# Patient Record
Sex: MALE | Race: BLACK OR AFRICAN AMERICAN | NOT HISPANIC OR LATINO | URBAN - METROPOLITAN AREA
[De-identification: names, ages, dates, MRNs, and addresses within clinical notes are randomized per-mention and may not be internally consistent; named-entity substitution may affect disease eponyms.]

---

## 2020-06-17 ENCOUNTER — OFFICE VISIT (OUTPATIENT)
Dept: URBAN - METROPOLITAN AREA CLINIC 113 | Facility: CLINIC | Age: 35
End: 2020-06-17

## 2020-07-25 ENCOUNTER — TELEPHONE ENCOUNTER (OUTPATIENT)
Dept: URBAN - METROPOLITAN AREA CLINIC 13 | Facility: CLINIC | Age: 35
End: 2020-07-25

## 2020-07-25 RX ORDER — DICYCLOMINE HYDROCHLORIDE 10 MG/1
TAKE 1 CAPSULE EVERY 6 HOURS PRN ABDOMINAL PAIN CAPSULE ORAL
Qty: 60 | Refills: 2 | OUTPATIENT
Start: 2017-08-31 | End: 2019-10-14

## 2020-07-25 RX ORDER — PROMETHAZINE HYDROCHLORIDE 12.5 MG/1
TAKE 1 TO 2 TABLETS EVERY 6 HOURS AS NEEDED TABLET ORAL
Qty: 60 | Refills: 1 | OUTPATIENT
Start: 2017-08-31 | End: 2019-10-14

## 2020-07-26 ENCOUNTER — TELEPHONE ENCOUNTER (OUTPATIENT)
Dept: URBAN - METROPOLITAN AREA CLINIC 13 | Facility: CLINIC | Age: 35
End: 2020-07-26

## 2020-07-26 RX ORDER — ELUXADOLINE 75 MG/1
TABLET, FILM COATED ORAL
Qty: 180 | Refills: 0 | Status: ACTIVE | COMMUNITY
Start: 2020-03-26

## 2020-07-26 RX ORDER — RIFAXIMIN 550 MG/1
TABLET ORAL
Qty: 42 | Refills: 0 | Status: ACTIVE | COMMUNITY
Start: 2020-05-07

## 2020-07-26 RX ORDER — PANTOPRAZOLE SODIUM 40 MG/1
TAKE 1 TABLET DAILY TABLET, DELAYED RELEASE ORAL
Qty: 60 | Refills: 11 | Status: ACTIVE | COMMUNITY

## 2020-07-26 RX ORDER — SUCRALFATE 1 G/1
TABLET ORAL
Qty: 120 | Refills: 0 | Status: ACTIVE | COMMUNITY
Start: 2019-10-23

## 2020-07-26 RX ORDER — TETRACYCLINE HYDROCHLORIDE 500 MG/1
CAPSULE ORAL
Qty: 56 | Refills: 0 | Status: ACTIVE | COMMUNITY
Start: 2020-06-09

## 2020-07-26 RX ORDER — METRONIDAZOLE 500 MG/1
TABLET ORAL
Qty: 42 | Refills: 0 | Status: ACTIVE | COMMUNITY
Start: 2020-06-09

## 2020-07-26 RX ORDER — DOXYCYCLINE HYCLATE 100 MG/1
TABLET ORAL
Qty: 20 | Refills: 0 | Status: ACTIVE | COMMUNITY
Start: 2020-03-26

## 2020-07-26 RX ORDER — BISMUTH SUBSALICYLATE 262 MG
TAKE 1 TABLET BY MOUTH FOUR TIMES A DAY FOR 14 DAYS TABLET ORAL
Qty: 72 | Refills: 0 | Status: ACTIVE | COMMUNITY
Start: 2020-06-09

## 2020-07-26 RX ORDER — CHOLESTYRAMINE 4 G/9G
POWDER, FOR SUSPENSION ORAL
Qty: 378 | Refills: 0 | Status: ACTIVE | COMMUNITY
Start: 2019-10-23

## 2020-07-26 RX ORDER — HYOSCYAMINE SULFATE 0.12 MG/1
TABLET, ORALLY DISINTEGRATING ORAL
Qty: 30 | Refills: 0 | Status: ACTIVE | COMMUNITY
Start: 2020-01-30

## 2020-09-16 ENCOUNTER — OFFICE VISIT (OUTPATIENT)
Dept: URBAN - METROPOLITAN AREA CLINIC 113 | Facility: CLINIC | Age: 35
End: 2020-09-16
Payer: COMMERCIAL

## 2020-09-16 VITALS
HEART RATE: 77 BPM | SYSTOLIC BLOOD PRESSURE: 134 MMHG | BODY MASS INDEX: 20.81 KG/M2 | DIASTOLIC BLOOD PRESSURE: 75 MMHG | WEIGHT: 157 LBS | HEIGHT: 73 IN | TEMPERATURE: 98.4 F

## 2020-09-16 DIAGNOSIS — E80.6 INDIRECT HYPERBILIRUBINEMIA: ICD-10-CM

## 2020-09-16 DIAGNOSIS — R10.13 EPIGASTRIC PAIN: ICD-10-CM

## 2020-09-16 PROCEDURE — G8420 CALC BMI NORM PARAMETERS: HCPCS | Performed by: INTERNAL MEDICINE

## 2020-09-16 PROCEDURE — G8427 DOCREV CUR MEDS BY ELIG CLIN: HCPCS | Performed by: INTERNAL MEDICINE

## 2020-09-16 PROCEDURE — 99204 OFFICE O/P NEW MOD 45 MIN: CPT | Performed by: INTERNAL MEDICINE

## 2020-09-16 PROCEDURE — 1036F TOBACCO NON-USER: CPT | Performed by: INTERNAL MEDICINE

## 2020-09-16 RX ORDER — BUSPIRONE HYDROCHLORIDE 5 MG/1
1 TABLET TABLET ORAL TWICE A DAY
Qty: 60 | Refills: 3 | OUTPATIENT
Start: 2020-09-19

## 2020-09-16 RX ORDER — PANTOPRAZOLE SODIUM 40 MG/1
TAKE 1 TABLET DAILY TABLET, DELAYED RELEASE ORAL
Qty: 60 | Refills: 11 | Status: ACTIVE | COMMUNITY

## 2020-09-16 RX ORDER — PANTOPRAZOLE SODIUM 40 MG/1
TAKE 1 TABLET DAILY TABLET, DELAYED RELEASE ORAL
OUTPATIENT

## 2020-09-16 NOTE — HPI-TODAY'S VISIT:
Mr. Singh is a 35-year-old gentleman with a history of cholecystectomy presenting for evaluation of upper abdominal pain.  He was initially seen in 2017 for epigastric pain and weight loss.  He had undergone  an unremarkable evaluation in [place], [place] including EGD and cholecystectomy.  Nonulcerative dyspepsia was suspected.  He was recommended an H. pylori breath test, labs, and begin Dexilant 60 mg daily.  H. pylori breath test was negative x2.    He did not return for follow up.    He was most recently referred to Dr. Donald.   An EGD on 10/29/19 was notable for normal esophagus, gastritis, nonbleeding duodenual ulcer (4mm).  Gastric biopsy showed chronic gastritis negative for H. pylori, and unremarkable duodenal mucosa. CT a/p without contrast (4/9/20) was unremarkable except for constipation. Labs (8/13/20): BMP normal, TB 3.3, ALP 81, AST 24, ALT 16, lipase 39, amylase 95.  Labs (3/26/20): TB 3.4, DB <0.2, ALP 80, AST 21, ALT 10, TP 7.7, Alb 4.4 Labs (4/24/20): WBC 3.8, Hgb 14.6, Plt 244;  Labs (3/26/20): TB 3.4, DB < 0.2, AST 21, ALT 10, ALP 80, TP 7.7, ALB 4.4. TAE < 1:80 Labs (10/30/19): AMA 11.2, TAE < 1:80, ASMA 11. Labs: TB 5.0, DB 0.2

## 2020-09-16 NOTE — PHYSICAL EXAM HENT:
Head,  normocephalic,  atraumatic,  Face,  Face within normal limits,  Ears,  External ears within normal limits,  Nose/Nasopharynx,  External nose  normal appearance, wearing a mask

## 2020-09-16 NOTE — PHYSICAL EXAM CHEST:
breathing is unlabored without accessory muscle use, normal breath sounds, clear to auscultation bilaterally

## 2020-09-16 NOTE — HPI-TODAY'S VISIT:
He presents as a second opinion regarding his chronic epigastric pain, nausea and dry heaves.  The abdominal pain is nonradiating, constant, waxes and wanes in severity but has no obvious triggering factor.  He has associated early satiety.  He reports that he is lost 10 to 15 pounds over the past years.  He has increased amounts of belching.  He denies any dysphagia.  He continues to take pantoprazole 40 mg daily, but does not believe that it is significantly improved his symptoms.  He typically has a bowel movement every other day.  He has had episodes of diarrhea in the past.  No blood per rectum or melena.  He is scheduled to have a gastric emptying study in late October.

## 2020-09-16 NOTE — PHYSICAL EXAM GASTROINTESTINAL
Abdomen , soft, nondistended , normal bowel sounds, mild epigastric tenderness without r/g; Liver and Spleen , no hepatosplenomegaly

## 2020-09-18 ENCOUNTER — TELEPHONE ENCOUNTER (OUTPATIENT)
Dept: URBAN - METROPOLITAN AREA CLINIC 113 | Facility: CLINIC | Age: 35
End: 2020-09-18

## 2020-10-06 ENCOUNTER — TELEPHONE ENCOUNTER (OUTPATIENT)
Dept: URBAN - METROPOLITAN AREA CLINIC 113 | Facility: CLINIC | Age: 35
End: 2020-10-06

## 2020-11-04 ENCOUNTER — TELEPHONE ENCOUNTER (OUTPATIENT)
Dept: URBAN - METROPOLITAN AREA CLINIC 113 | Facility: CLINIC | Age: 35
End: 2020-11-04

## 2020-11-19 ENCOUNTER — OFFICE VISIT (OUTPATIENT)
Dept: URBAN - METROPOLITAN AREA CLINIC 113 | Facility: CLINIC | Age: 35
End: 2020-11-19

## 2020-11-19 RX ORDER — BUSPIRONE HYDROCHLORIDE 5 MG/1
1 TABLET TABLET ORAL TWICE A DAY
Qty: 60 | Refills: 3 | Status: ACTIVE | COMMUNITY
Start: 2020-09-19

## 2020-11-19 RX ORDER — PANTOPRAZOLE SODIUM 40 MG/1
TAKE 1 TABLET DAILY TABLET, DELAYED RELEASE ORAL
Status: ACTIVE | COMMUNITY

## 2020-11-19 NOTE — HPI-TODAY'S VISIT:
Mr. Singh is a 35-year-old male presenting for follow up regarding stomach issues.   He was last seen for a second opinion 9/16/2020 regarding  chronic epigastric pain, nausea and dry heaves.  He reported pain was nonradiating, constant, waxing and waning in severity without obvious triggering factor.  He reported associated early satiety and a 10-15lb weight loss over years. Pantoprazole had provided no benefit. He reported a BM QOD.  He admitted episodes of diarrhea in the past but no blood per rectum or melena.  He was scheduled to have a gastric emptying study in late October.

## 2020-11-19 NOTE — HPI-OTHER HISTORIES
He was initially seen in 2017 for epigastric pain and weight loss.  He had undergone  an unremarkable evaluation in [place], [place] including EGD and cholecystectomy.  Nonulcerative dyspepsia was suspected.  He was recommended an H. pylori breath test, labs, and begin Dexilant 60 mg daily.  H. pylori breath test was negative x2.    He did not return for follow up.   . An EGD on 10/29/19 was notable for normal esophagus, gastritis, nonbleeding duodenual ulcer (4mm).  Gastric biopsy showed chronic gastritis negative for H. pylori, and unremarkable duodenal mucosa. CT a/p without contrast (4/9/20) was unremarkable except for constipation. Labs (8/13/20) : BMP normal, TB 3.3, ALP 81, AST 24, ALT 16, lipase 39, amylase 95. Labs (3/26/20) : TB 3.4, DB <0.2, ALP 80, AST 21, ALT 10, TP 7.7, Alb 4.4 Labs (4/24/20) : WBC 3.8, Hgb 14.6, Plt 244; Labs (3/26/20) : TB 3.4, DB < 0.2, AST 21, ALT 10, ALP 80, TP 7.7, ALB 4.4. TAE < 1 : 80 Labs (10/30/19) : AMA 11.2, TAE < 1:80, ASMA 11. Labs : TB 5.0, DB 0.2

## 2020-11-19 NOTE — HPI-TODAY'S VISIT:
He presents as a second opinion regarding his chronic epigastric pain, nausea and dry heaves.  The abdominal pain is nonradiating, constant, waxes and wanes in severity but has no obvious triggering factor.  He has associated early satiety.  He reports that he is lost 10 to 15 pounds over the past years.  He has increased amounts of belching.  He denies any dysphagia.  He continues to take pantoprazole 40 mg daily, but does not believe that it is significantly improved his symptoms.  He typically has a bowel movement every other day.  He has had episodes of diarrhea in the past.  No blood per rectum or melena.  He is scheduled to have a gastric emptying study in late October. Records were reviewed.  The differential diagnosis included nonulcer dyspepsia.  He reported no significant improvement with pantoprazole.  Visceral hypersensitivity and the potential benefit of a nonnarcotic pain modulator such as buspirone or a low dose tricyclic antidepressant were discussed.  He agreed to begin buspirone.  Gastric emptying study was ordered to assess for gastroparesis due to a complaint of early satiety.  Labs were notable for indirect hyperbilirubinemia likely associated with Gilbert syndrome.  It was discussed there was no laboratory evidence of hemolysis and recent CT abdomen pelvis showed no obvious hepatobiliary abnormalities.  An MRI/MRCP was determined to likely be of low yield.  He was prescribed BuSpar 5 mg twice a day and instructed to continue pantoprazole 40 mg daily.

## 2020-12-10 ENCOUNTER — OFFICE VISIT (OUTPATIENT)
Dept: URBAN - METROPOLITAN AREA CLINIC 113 | Facility: CLINIC | Age: 35
End: 2020-12-10
Payer: COMMERCIAL

## 2020-12-10 VITALS
SYSTOLIC BLOOD PRESSURE: 122 MMHG | TEMPERATURE: 97.5 F | BODY MASS INDEX: 21.74 KG/M2 | WEIGHT: 164 LBS | DIASTOLIC BLOOD PRESSURE: 83 MMHG | HEART RATE: 74 BPM | HEIGHT: 73 IN

## 2020-12-10 DIAGNOSIS — E80.6 INDIRECT HYPERBILIRUBINEMIA: ICD-10-CM

## 2020-12-10 DIAGNOSIS — R10.13 EPIGASTRIC PAIN: ICD-10-CM

## 2020-12-10 PROCEDURE — 99204 OFFICE O/P NEW MOD 45 MIN: CPT | Performed by: INTERNAL MEDICINE

## 2020-12-10 RX ORDER — BUSPIRONE HYDROCHLORIDE 5 MG/1
1 TABLET TABLET ORAL TWICE A DAY
Qty: 60 | Refills: 3 | Status: ON HOLD | COMMUNITY
Start: 2020-09-19

## 2020-12-10 RX ORDER — SUCRALFATE 1 G
1 TABLET ON AN EMPTY STOMACH TABLET ORAL TWICE A DAY
Qty: 60 | OUTPATIENT
Start: 2020-12-10 | End: 2021-01-09

## 2020-12-10 RX ORDER — PANTOPRAZOLE SODIUM 40 MG/1
TAKE 1 TABLET DAILY TABLET, DELAYED RELEASE ORAL
Status: ON HOLD | COMMUNITY

## 2020-12-10 NOTE — HPI-TODAY'S VISIT:
Mr Pisano is a 35-year-old gentleman with a history of cholecystectomy that who was originally self-referred here in 2017 for evaluation of "stomach issues." He was seen by Dr. Romo at that point.    He reported a one-yearhistory of epigastric pain that was constant, waxed and waned in severity, nonradiating, and occasionally associated with dizziness.  He had significant amounts of belching independent of meals.  The pain does not seem to be exacerbated by meals.  His weight had declined from 180 pounds to 160 pounds secondary to these symptoms.  He had occasional bouts of diarrhea but otherwise his bowel habits were regular and stool consistency was normal.  He denied any fevers, chills, melena, or red blood per rectum.  He did not routinely use NSAIDs and rarely had heartburn, and he denied any regurgitation or dysphagia.  He has taken Prilosec over-the-counter in the past without any improvement.  He was evaluated in [facility] and apparently underwent an EGD and cholecystectomy.  His symptoms have persisted despite the cholecystectomy.  CT of the abdomen and pelvis on 9/6/17 was normal except for mild gastritis and prior cholecystectomy.  Labs were normal except for a mild elevation of total bilirubin with normal LFTs. CBC, CMP, and CRP were normal.  H pylori breath tests were normal.   He has not been back here for follow up since his initial New Patient evaluation. The patient tells me that he began having abdominal pain in the epigastric area while living in [facility] about 5 years ago.  He describes the pain is constant and unrelenting, located primarily at the epigastrium.  He associates the pain with belching or bloating.  Food ingestion does not change the pain character or intensity.  It is 5 out of 10 in intensity typically.  Interestingly, it is better after the ingestion of ice cream or milk products, but returns after a day.  Spicy foods will increase his symptoms as were eating later in the evening.  It sometimes awakens him from sleep.  He has had nausea but no emesis.  He has had no melena, hematemesis, coffee-ground emesis, bright red rectal bleeding or melena.  His bowels are regular, with 1 bowel movement per day typically which is formed.  Although he lost 15 pounds initially after this pain began, his weight has been stable now for 3 years.  He had an episode of syncope as a result of the pain about a month ago, which prompted him to seek additional evaluation. Since seeing Dr. Romo in 2017, he was referred to Dr. Rodriguez Donald at [facility].  Dr. Donald performed a CT scan of the abdomen and pelvis on October 19, 2019 which was remarkable only for prior cholecystectomy and an ileus.  An upper endoscopy performed on October 29, 2019 showed gastritis and a 4 mm duodenal ulcer with biopsies being negative for Helicobacter pylori.  Small bowel biopsies to rule out celiac sprue were negative.  Patient had a CT scan of the abdomen and pelvis on April 9, 2020 which only showed constipation.  A gastric emptying study done on October 29, 2020 showed a T1 half emptying of 52%, normal being 50%, which places this within the realm of normalcy. This is the extent of the patient's evaluation thus far.

## 2021-01-11 ENCOUNTER — ERX REFILL RESPONSE (OUTPATIENT)
Dept: URBAN - METROPOLITAN AREA CLINIC 113 | Facility: CLINIC | Age: 36
End: 2021-01-11

## 2021-01-11 RX ORDER — SUCRALFATE 1 G/1
1 TABLET ON AN EMPTY STOMACH TABLET ORAL TWICE A DAY
Qty: 60 | Refills: 0

## 2021-02-09 ENCOUNTER — OFFICE VISIT (OUTPATIENT)
Dept: URBAN - METROPOLITAN AREA CLINIC 113 | Facility: CLINIC | Age: 36
End: 2021-02-09

## 2021-02-09 NOTE — HPI-TODAY'S VISIT:
Mr Pisano is a 35-year-old gentleman with a history of cholecystectomy that who was originally self-referred here in 2017 for evaluation of "stomach issues." He was seen by Dr. Romo at that point.    He reported a one-yearhistory of epigastric pain that was constant, waxed and waned in severity, nonradiating, and occasionally associated with dizziness.  He had significant amounts of belching independent of meals.  The pain does not seem to be exacerbated by meals.  His weight had declined from 180 pounds to 160 pounds secondary to these symptoms.  He had occasional bouts of diarrhea but otherwise his bowel habits were regular and stool consistency was normal.  He denied any fevers, chills, melena, or red blood per rectum.  He did not routinely use NSAIDs and rarely had heartburn, and he denied any regurgitation or dysphagia.  He has taken Prilosec over-the-counter in the past without any improvement.  He was evaluated in [facility] and apparently underwent an EGD and cholecystectomy.  His symptoms have persisted despite the cholecystectomy.  CT of the abdomen and pelvis on 9/6/17 was normal except for mild gastritis and prior cholecystectomy.  Labs were normal except for a mild elevation of total bilirubin with normal LFTs. CBC, CMP, and CRP were normal.  H pylori breath tests were normal.   He has not been back here for follow up since his initial New Patient evaluation. The patient tells me that he began having abdominal pain in the epigastric area while living in [facility] about 5 years ago.  He describes the pain is constant and unrelenting, located primarily at the epigastrium.  He associates the pain with belching or bloating.  Food ingestion does not change the pain character or intensity.  It is 5 out of 10 in intensity typically.  Interestingly, it is better after the ingestion of ice cream or milk products, but returns after a day.  Spicy foods will increase his symptoms as were eating later in the evening.  It sometimes awakens him from sleep.  He has had nausea but no emesis.  He has had no melena, hematemesis, coffee-ground emesis, bright red rectal bleeding or melena.  His bowels are regular, with 1 bowel movement per day typically which is formed.  Although he lost 15 pounds initially after this pain began, his weight has been stable now for 3 years.  He had an episode of syncope as a result of the pain about a month ago, which prompted him to seek additional evaluation. Since seeing Dr. Romo in 2017, he was referred to Dr. Rodriguez Donald at [facility].  Dr. Donald performed a CT scan of the abdomen and pelvis on October 19, 2019 which was remarkable only for prior cholecystectomy and an ileus.  An upper endoscopy performed on October 29, 2019 showed gastritis and a 4 mm duodenal ulcer with biopsies being negative for Helicobacter pylori.  Small bowel biopsies to rule out celiac sprue were negative.  Patient had a CT scan of the abdomen and pelvis on April 9, 2020 which only showed constipation.  A gastric emptying study done on October 29, 2020 showed a T1 half emptying of 52%, normal being 50%, which places this within the realm of normalcy. This is the extent of the patient's evaluation thus far.

## 2021-02-11 ENCOUNTER — TELEPHONE ENCOUNTER (OUTPATIENT)
Dept: URBAN - METROPOLITAN AREA CLINIC 113 | Facility: CLINIC | Age: 36
End: 2021-02-11

## 2021-02-16 ENCOUNTER — OFFICE VISIT (OUTPATIENT)
Dept: URBAN - METROPOLITAN AREA CLINIC 113 | Facility: CLINIC | Age: 36
End: 2021-02-16
Payer: COMMERCIAL

## 2021-02-16 VITALS
DIASTOLIC BLOOD PRESSURE: 74 MMHG | HEART RATE: 90 BPM | WEIGHT: 164 LBS | TEMPERATURE: 98.6 F | HEIGHT: 73 IN | RESPIRATION RATE: 20 BRPM | SYSTOLIC BLOOD PRESSURE: 120 MMHG | BODY MASS INDEX: 21.74 KG/M2

## 2021-02-16 DIAGNOSIS — R10.13 EPIGASTRIC PAIN: ICD-10-CM

## 2021-02-16 DIAGNOSIS — E80.6 INDIRECT HYPERBILIRUBINEMIA: ICD-10-CM

## 2021-02-16 PROCEDURE — 99214 OFFICE O/P EST MOD 30 MIN: CPT | Performed by: INTERNAL MEDICINE

## 2021-02-16 RX ORDER — PANTOPRAZOLE SODIUM 40 MG/1
TAKE 1 TABLET DAILY TABLET, DELAYED RELEASE ORAL
Status: ON HOLD | COMMUNITY

## 2021-02-16 RX ORDER — BUSPIRONE HYDROCHLORIDE 5 MG/1
1 TABLET TABLET ORAL TWICE A DAY
Qty: 60 | Refills: 3 | Status: ON HOLD | COMMUNITY
Start: 2020-09-19

## 2021-02-16 RX ORDER — AMITRIPTYLINE HYDROCHLORIDE 25 MG/1
1 TABLET AT BEDTIME TABLET, FILM COATED ORAL ONCE A DAY
Qty: 30 | OUTPATIENT
Start: 2021-02-16

## 2021-02-16 NOTE — HPI-TODAY'S VISIT:
Mr Pisano is a 35-year-old gentleman with a history of cholecystectomy that who was originally self-referred here in 2017 for evaluation of "stomach issues." He was seen by Dr. Romo at that point.    He reported a one-yearhistory of epigastric pain that was constant, waxed and waned in severity, nonradiating, and occasionally associated with dizziness.  He had significant amounts of belching independent of meals.  The pain does not seem to be exacerbated by meals.  His weight had declined from 180 pounds to 160 pounds secondary to these symptoms.  He had occasional bouts of diarrhea but otherwise his bowel habits were regular and stool consistency was normal.  He denied any fevers, chills, melena, or red blood per rectum.  He did not routinely use NSAIDs and rarely had heartburn, and he denied any regurgitation or dysphagia.  He has taken Prilosec over-the-counter in the past without any improvement.  He was evaluated in [facility] and apparently underwent an EGD and cholecystectomy.  His symptoms have persisted despite the cholecystectomy.  CT of the abdomen and pelvis on 9/6/17 was normal except for mild gastritis and prior cholecystectomy.  Labs were normal except for a mild elevation of total bilirubin with normal LFTs. CBC, CMP, and CRP were normal.  H pylori breath tests were normal.   He has not been back here for follow up since his initial New Patient evaluation. The patient tells me that he began having abdominal pain in the epigastric area while living in [facility] about 5 years ago.  He describes the pain is constant and unrelenting, located primarily at the epigastrium.  He associates the pain with belching or bloating.  Food ingestion does not change the pain character or intensity.  It is 5 out of 10 in intensity typically.  Interestingly, it is better after the ingestion of ice cream or milk products, but returns after a day.  Spicy foods will increase his symptoms as were eating later in the evening.  It sometimes awakens him from sleep.  He has had nausea but no emesis.  He has had no melena, hematemesis, coffee-ground emesis, bright red rectal bleeding or melena.  His bowels are regular, with 1 bowel movement per day typically which is formed.  Although he lost 15 pounds initially after this pain began, his weight has been stable now for 3 years.  He had an episode of syncope as a result of the pain about a month ago, which prompted him to seek additional evaluation. Since seeing Dr. Romo in 2017, he was referred to Dr. Rodriguez Donald at [facility].  Dr. Donald performed a CT scan of the abdomen and pelvis on October 19, 2019 which was remarkable only for prior cholecystectomy and an ileus.  An upper endoscopy performed on October 29, 2019 showed gastritis and a 4 mm duodenal ulcer with biopsies being negative for Helicobacter pylori.  Small bowel biopsies to rule out celiac sprue were negative.  Patient had a CT scan of the abdomen and pelvis on April 9, 2020 which only showed constipation.  A gastric emptying study done on October 29, 2020 showed a T1 half emptying of 52%, normal being 50%, which places this within the realm of normalcy. This is the extent of the patient's evaluation At the time of his last visit here on December 10, 2020.  After that visit, the patient was placed on pantoprazole and Carafate, as well as buspirone.  He was scheduled for an MRCP which was done on December 30, 2020.  This was negative.  He had a prior gastric emptying study which was negative, so he did not schedule this.  The patient returns today with persistent sensation of being full after eating and abdominal bloating.  His weight is stable at 164 pounds.  He denies nausea and vomiting.   He felt that the Carafate was not helpful so he stopped it.

## 2021-03-08 ENCOUNTER — OFFICE VISIT (OUTPATIENT)
Dept: URBAN - METROPOLITAN AREA CLINIC 113 | Facility: CLINIC | Age: 36
End: 2021-03-08

## 2021-03-15 ENCOUNTER — ERX REFILL RESPONSE (OUTPATIENT)
Dept: URBAN - METROPOLITAN AREA CLINIC 113 | Facility: CLINIC | Age: 36
End: 2021-03-15

## 2021-03-15 RX ORDER — AMITRIPTYLINE HYDROCHLORIDE 25 MG/1
1 TABLET AT BEDTIME TABLET, FILM COATED ORAL ONCE A DAY
Qty: 30 | Refills: 0

## 2021-03-28 ENCOUNTER — ERX REFILL RESPONSE (OUTPATIENT)
Dept: URBAN - METROPOLITAN AREA CLINIC 113 | Facility: CLINIC | Age: 36
End: 2021-03-28

## 2021-03-28 RX ORDER — AMITRIPTYLINE HYDROCHLORIDE 25 MG/1
1 TABLET AT BEDTIME TABLET, FILM COATED ORAL ONCE A DAY
Qty: 30 | Refills: 0 | OUTPATIENT

## 2021-03-28 RX ORDER — AMITRIPTYLINE HYDROCHLORIDE 25 MG/1
TAKE 1 TABLET BY MOUTH EVERYDAY AT BEDTIME TABLET, FILM COATED ORAL
Qty: 90 TABLET | Refills: 0 | OUTPATIENT

## 2021-04-15 ENCOUNTER — OFFICE VISIT (OUTPATIENT)
Dept: URBAN - METROPOLITAN AREA CLINIC 107 | Facility: CLINIC | Age: 36
End: 2021-04-15

## 2021-04-15 RX ORDER — PANTOPRAZOLE SODIUM 40 MG/1
TAKE 1 TABLET DAILY TABLET, DELAYED RELEASE ORAL
Status: ON HOLD | COMMUNITY

## 2021-04-15 RX ORDER — AMITRIPTYLINE HYDROCHLORIDE 25 MG/1
TAKE 1 TABLET BY MOUTH EVERYDAY AT BEDTIME TABLET, FILM COATED ORAL
Qty: 90 TABLET | Refills: 0 | Status: ACTIVE | COMMUNITY

## 2021-04-15 RX ORDER — BUSPIRONE HYDROCHLORIDE 5 MG/1
1 TABLET TABLET ORAL TWICE A DAY
Qty: 60 | Refills: 3 | Status: ON HOLD | COMMUNITY
Start: 2020-09-19

## 2021-04-15 NOTE — HPI-TODAY'S VISIT:
This is a 35-year-old gentleman with a history of GERD, epigastric pain, and unintentional weight loss presenting for evaluation of abdominal pain.  He was seen once in August 2017 for GERD, epigastric pain, and weight loss that has been present for a year.  He had undergone cholecystectomy without improvement of symptoms.  Labs and an H. pylori breath test were ordered and he was prescribed Dexilant.  CMP, CBC, and CRP were negative and H. pylori breath test was negative.  He did not return for follow-up. Labs:4/9/2020 CBC: WBC 2.29, hemoglobin 14.3, MCV 91.9, platelet 269.  TTG IgG 6 (H), TTG IgA less than 2.  3/26/2020 include RF 10.8.  TAE less than 1-80.  5/8/2020 stool O&P, C. difficile, culture negative. CT of the abdomen and pelvis without contrast 4/9/2020:Constipation otherwise negative study.

## 2021-04-30 ENCOUNTER — OFFICE VISIT (OUTPATIENT)
Dept: URBAN - METROPOLITAN AREA CLINIC 107 | Facility: CLINIC | Age: 36
End: 2021-04-30
Payer: COMMERCIAL

## 2021-04-30 VITALS
HEART RATE: 72 BPM | WEIGHT: 164 LBS | DIASTOLIC BLOOD PRESSURE: 85 MMHG | HEIGHT: 73 IN | TEMPERATURE: 97.5 F | RESPIRATION RATE: 18 BRPM | SYSTOLIC BLOOD PRESSURE: 127 MMHG | BODY MASS INDEX: 21.74 KG/M2

## 2021-04-30 DIAGNOSIS — R10.13 EPIGASTRIC PAIN: ICD-10-CM

## 2021-04-30 PROCEDURE — 99213 OFFICE O/P EST LOW 20 MIN: CPT | Performed by: NURSE PRACTITIONER

## 2021-04-30 RX ORDER — BUSPIRONE HYDROCHLORIDE 5 MG/1
1 TABLET TABLET ORAL TWICE A DAY
Qty: 60 | Refills: 3 | Status: ON HOLD | COMMUNITY
Start: 2020-09-19

## 2021-04-30 RX ORDER — PANTOPRAZOLE SODIUM 40 MG/1
TAKE 1 TABLET DAILY TABLET, DELAYED RELEASE ORAL
Status: ON HOLD | COMMUNITY

## 2021-04-30 RX ORDER — AMITRIPTYLINE HYDROCHLORIDE 25 MG/1
TAKE 1 TABLET BY MOUTH EVERYDAY AT BEDTIME TABLET, FILM COATED ORAL
Qty: 90 TABLET | Refills: 0 | Status: ACTIVE | COMMUNITY

## 2021-05-01 PROBLEM — 3696007: Status: ACTIVE | Noted: 2021-05-01

## 2021-05-01 PROBLEM — 14783006: Status: ACTIVE | Noted: 2020-09-16

## 2021-06-28 ENCOUNTER — TELEPHONE ENCOUNTER (OUTPATIENT)
Dept: URBAN - METROPOLITAN AREA CLINIC 113 | Facility: CLINIC | Age: 36
End: 2021-06-28

## 2021-06-29 ENCOUNTER — OFFICE VISIT (OUTPATIENT)
Dept: URBAN - METROPOLITAN AREA CLINIC 107 | Facility: CLINIC | Age: 36
End: 2021-06-29

## 2021-06-29 RX ORDER — BUSPIRONE HYDROCHLORIDE 5 MG/1
1 TABLET TABLET ORAL TWICE A DAY
Qty: 60 | Refills: 3 | Status: ON HOLD | COMMUNITY
Start: 2020-09-19

## 2021-06-29 RX ORDER — AMITRIPTYLINE HYDROCHLORIDE 25 MG/1
TAKE 1 TABLET BY MOUTH EVERYDAY AT BEDTIME TABLET, FILM COATED ORAL
Qty: 90 TABLET | Refills: 0 | Status: ACTIVE | COMMUNITY

## 2021-06-29 RX ORDER — PANTOPRAZOLE SODIUM 40 MG/1
TAKE 1 TABLET DAILY TABLET, DELAYED RELEASE ORAL
Status: ON HOLD | COMMUNITY

## 2021-06-29 NOTE — HPI-TODAY'S VISIT:
This is a 35-year-old male with a history of chronic epigastric abdominal pain and indirect hyperbilirubinemia likely Gilbert's syndrome presenting for follow-up after a trial of amitriptyline.  He was initially seen 2/16/2021.  He reported a 1 year history of constant epigastric pain that waxed and waned in severity.  He had previously undergone a diagnostic evaluation including labs, CT, and EGD.  He had also had an MRCP to evaluate hyperbilirubinemia.  No organic findings were discovered on evaluation.  There is been no significant improvement with pantoprazole.  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  He was prescribed amitriptyline as a nonnarcotic pain modulator.  Amitriptyline has been of no benefit.  He continues to have pain indicated in epigastrium when his stomach is empty.  This pain is not exacerbated by eating.  When he eats, he feels as though his food is "not digesting."  He has frequent belching and feels bloated.  He has intermittent nausea associated with his symptoms.  He denies heartburn, dysphagia, red blood per rectum, diarrhea, or constipation.  He is wondering if he needs another EGD.

## 2021-10-06 ENCOUNTER — OFFICE VISIT (OUTPATIENT)
Dept: URBAN - METROPOLITAN AREA CLINIC 107 | Facility: CLINIC | Age: 36
End: 2021-10-06

## 2021-11-23 ENCOUNTER — OFFICE VISIT (OUTPATIENT)
Dept: URBAN - METROPOLITAN AREA CLINIC 107 | Facility: CLINIC | Age: 36
End: 2021-11-23

## 2022-01-07 ENCOUNTER — WEB ENCOUNTER (OUTPATIENT)
Dept: URBAN - METROPOLITAN AREA CLINIC 113 | Facility: CLINIC | Age: 37
End: 2022-01-07

## 2022-01-07 ENCOUNTER — OFFICE VISIT (OUTPATIENT)
Dept: URBAN - METROPOLITAN AREA CLINIC 113 | Facility: CLINIC | Age: 37
End: 2022-01-07
Payer: COMMERCIAL

## 2022-01-07 VITALS
HEIGHT: 73 IN | TEMPERATURE: 98.1 F | SYSTOLIC BLOOD PRESSURE: 145 MMHG | BODY MASS INDEX: 21.47 KG/M2 | RESPIRATION RATE: 18 BRPM | DIASTOLIC BLOOD PRESSURE: 78 MMHG | HEART RATE: 81 BPM | WEIGHT: 162 LBS

## 2022-01-07 DIAGNOSIS — R63.4 WEIGHT LOSS: ICD-10-CM

## 2022-01-07 DIAGNOSIS — K58.0 IRRITABLE BOWEL SYNDROME WITH DIARRHEA: ICD-10-CM

## 2022-01-07 DIAGNOSIS — R10.13 EPIGASTRIC PAIN: ICD-10-CM

## 2022-01-07 DIAGNOSIS — E80.6 INDIRECT HYPERBILIRUBINEMIA: ICD-10-CM

## 2022-01-07 DIAGNOSIS — R14.0 ABDOMINAL BLOATING: ICD-10-CM

## 2022-01-07 PROCEDURE — 99214 OFFICE O/P EST MOD 30 MIN: CPT | Performed by: NURSE PRACTITIONER

## 2022-01-07 RX ORDER — BUSPIRONE HYDROCHLORIDE 5 MG/1
1 TABLET TABLET ORAL TWICE A DAY
Qty: 60 | Refills: 3 | Status: DISCONTINUED | COMMUNITY
Start: 2020-09-19

## 2022-01-07 RX ORDER — AMITRIPTYLINE HYDROCHLORIDE 25 MG/1
TAKE 1 TABLET BY MOUTH EVERYDAY AT BEDTIME TABLET, FILM COATED ORAL
Qty: 90 TABLET | Refills: 0 | Status: DISCONTINUED | COMMUNITY

## 2022-01-07 RX ORDER — PANTOPRAZOLE SODIUM 40 MG/1
TAKE 1 TABLET DAILY TABLET, DELAYED RELEASE ORAL
Status: DISCONTINUED | COMMUNITY

## 2022-01-07 NOTE — HPI-TODAY'S VISIT:
This is a 36-year-old male with a history of chronic epigastric abdominal pain and indirect hyperbilirubinemia likely Gilbert's syndrome presenting for follow-up.   He was last seen 4/30/2021.  He had initially been seen in February 2021 and reported a 1 year history of constant pain.  Diagnostic evaluation with labs, CT, EGD, and MRCP were unremarkable.  There had been no improvement with pantoprazole.  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  He continued to have pain indicating epigastrium when his stomach was empty.  It was not exacerbated by eating and he felt as though his food was not digesting.  He had frequent belching and bloating.  He had intermittent nausea associated with symptoms.  He denied other abdominal symptoms.  It was discussed that his symptoms are chronic and likely functional associated with nonulcer dyspepsia and that diet may be playing a role.  Lifestyle modification was discussed to reduce air swallowing and a diet low in fermentable sugars was recommended.  He was to try FDgard prior to meals.  Cymbalta was a future consideration.  Due to a history of a small duodenal ulcer, repeat EGD was also a consideration.  He continues to report persistent, ongoing symptoms.  He has been on a low FODMAP diet since his last visit and reports no improvement.  He reports frequent belching and bloating.  He continues to slowly lose weight (our records reflect a 2 pound weight loss since his last visit).  He has persistent pain indicated in the epigastrium that is worse after meals and occasionally worse during a bowel movement.  He had worsening pain on Nexium and discontinued it.  He denies NSAID use.  He reports intermittent dark urine even with drinking adequate amounts of water.  He has occasional skin rashes and itching. He continues to report a sensation that his food is not digesting and it "sits in my stomach too long."  He reports intermittent diarrhea.  He is having 2 bowel movements per day most days.  His stools are occasionally watery.  He denies nocturnal bowel movements, red blood per rectum, or melena.

## 2022-01-07 NOTE — HPI-OTHER HISTORIES
MRCP 12/30/2021:Status postcholecystectomy.  No evidence of biliary dilation or choledocholithiasis.  There is a segment of the common hepatic duct which is not visualized due to artifact from cholecystectomy clips.  Paucity of body fat.  Incidentally noted Riedel's lobe and pectus excavatum. Gastric emptying study 10/29/2020:T1 half emptying of 52%, normal being 50% placing this in the realm of normal. CT abdomen and pelvis without contrast 4/9/2020: Constipation otherwise negative study.  Labs: 12/22/2021 H. pylori negative.  Urinalysis normal with exception of specific gravity greater than 1.030, trace protein, trace blood.  CBC: WBC 2.9, hemoglobin 14.5, MCV 93.4, platelet 239.  BMP normal.  LFTs: TB 2.9, ALP 94, ALT 15, AST 12.  Lipase 25.  Amylase 77. 5/8/2020:Stool culture negative.  Stool C. difficile negative.  Stool O&P negative. 8/13/2020:Hepatitis A antibody IgM, hepatitis B surface antigen, hepatitis B core antibody, hepatitis C antibody nonreactive.  HIV nonreactive.  H. pylori negative.  BMP normal with exception of carbon dioxide 32, glucose 60.  LFTs: TB 3.3, ALP 81, ALT 16, AST 24.  Amylase 95.  Lipase 39. EGD 10/29/2019:Normal esophagus, gastritis, nonbleeding duodenal ulcer (4 mm).  Gastric biopsy showed chronic gastritis negative for H. pylori and unremarkable duodenal mucosa.  (Dr. Donald). CT of the abdomen and pelvis with contrast 9/6/2017:Negative study for focal mass, lymphadenopathy, or inflammatory process.  Mild gastritis and cholecystectomy.  Mild gastrointestinal stasis pattern.

## 2022-01-08 ENCOUNTER — TELEPHONE ENCOUNTER (OUTPATIENT)
Dept: URBAN - METROPOLITAN AREA CLINIC 113 | Facility: CLINIC | Age: 37
End: 2022-01-08

## 2022-04-14 ENCOUNTER — OFFICE VISIT (OUTPATIENT)
Dept: URBAN - METROPOLITAN AREA CLINIC 113 | Facility: CLINIC | Age: 37
End: 2022-04-14
Payer: COMMERCIAL

## 2022-04-14 VITALS
RESPIRATION RATE: 18 BRPM | HEART RATE: 73 BPM | WEIGHT: 160 LBS | TEMPERATURE: 98.2 F | DIASTOLIC BLOOD PRESSURE: 81 MMHG | SYSTOLIC BLOOD PRESSURE: 120 MMHG | HEIGHT: 73 IN | BODY MASS INDEX: 21.2 KG/M2

## 2022-04-14 DIAGNOSIS — R63.4 WEIGHT LOSS: ICD-10-CM

## 2022-04-14 DIAGNOSIS — R14.0 ABDOMINAL BLOATING: ICD-10-CM

## 2022-04-14 DIAGNOSIS — E80.6 INDIRECT HYPERBILIRUBINEMIA: ICD-10-CM

## 2022-04-14 DIAGNOSIS — R10.13 EPIGASTRIC PAIN: ICD-10-CM

## 2022-04-14 DIAGNOSIS — K58.0 IRRITABLE BOWEL SYNDROME WITH DIARRHEA: ICD-10-CM

## 2022-04-14 PROBLEM — 197125005: Status: ACTIVE | Noted: 2022-01-07

## 2022-04-14 PROCEDURE — 99214 OFFICE O/P EST MOD 30 MIN: CPT | Performed by: INTERNAL MEDICINE

## 2022-04-14 NOTE — PHYSICAL EXAM GASTROINTESTINAL
Abdomen , soft, epigastrium tender, nondistended , no guarding or rigidity , no masses palpable , normal bowel sounds , Liver and Spleen , no hepatosplenomegaly

## 2022-04-14 NOTE — HPI-TODAY'S VISIT:
This is a 36-year-old male with a history of chronic epigastric abdominal pain and indirect hyperbilirubinemia likely Gilbert's syndrome presenting for follow-up.  He was last seen 1/7/22 by Abby Mcnair.  He had initially been seen in February 2021 and reported a 1 year history of constant pain.  Diagnostic evaluation with labs, CT, EGD, and MRCP were unremarkable.  There had been no improvement with pantoprazole.  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  He continued to have pain indicating epigastrium when his stomach was empty.  It was not exacerbated by eating and he felt as though his food was not digesting.  He had frequent belching and bloating.  He had intermittent nausea associated with symptoms.  He denied other abdominal symptoms.  It was discussed that his symptoms are chronic and likely functional associated with nonulcer dyspepsia and that diet may be playing a role.  Lifestyle modification was discussed to reduce air swallowing and a diet low in fermentable sugars was recommended.  He was to try FDgard prior to meals.  Cymbalta was a future consideration.  Due to a history of a small duodenal ulcer, repeat EGD was also a consideration.  At his last visit here on 1/7/22, he continued to report persistent, ongoing symptoms.  He has been on a low FODMAP diet since his last visit and reports no improvement.  He reports frequent belching and bloating.  He continues to slowly lose weight (our records reflect a 2 pound weight loss since his last visit, and 4 lbs overall).  He has persistent epigastric pain, worse after meals, and occasionally worse during a bowel movement.  He had worsening pain on Nexium and discontinued it.  He denies NSAID use.  He reports intermittent dark urine even with drinking adequate amounts of water.  He has occasional skin rashes and itching. He continues to report a sensation that his food is not digesting and says it "sits in my stomach too long."  He reports intermittent diarrhea.  He is having 2-3 loose bowel movements per day most days.  His stools are occasionally watery.  He denies nocturnal bowel movements, red blood per rectum, or melena.   After his last visit with Abby Mcnair, the patient had an order for Xifaxan sent in, and was supposed to be ordered for a Doppler sonogram of the liver and mesenteric vessels.  Neither of these took place, as the Xifaxan was too expensive, and the Doppler sonogram was never done.  His symptoms persist, as described above.  They are unchanged.  He has complaints of periodic epigastric discomfort, Almost 20 daily basis, with increased bloating and belching.  As noted above, he has lost an additional 2 pounds since his last office visit.

## 2022-04-20 ENCOUNTER — TELEPHONE ENCOUNTER (OUTPATIENT)
Dept: URBAN - METROPOLITAN AREA CLINIC 113 | Facility: CLINIC | Age: 37
End: 2022-04-20

## 2022-05-02 ENCOUNTER — TELEPHONE ENCOUNTER (OUTPATIENT)
Dept: URBAN - METROPOLITAN AREA CLINIC 113 | Facility: CLINIC | Age: 37
End: 2022-05-02

## 2022-05-02 RX ORDER — METRONIDAZOLE 250 MG/1
1 TABLET TABLET ORAL THREE TIMES A DAY
Qty: 30 | OUTPATIENT
Start: 2022-05-02 | End: 2022-05-12

## 2022-07-18 ENCOUNTER — OFFICE VISIT (OUTPATIENT)
Dept: URBAN - METROPOLITAN AREA CLINIC 113 | Facility: CLINIC | Age: 37
End: 2022-07-18

## 2022-08-11 ENCOUNTER — TELEPHONE ENCOUNTER (OUTPATIENT)
Dept: URBAN - METROPOLITAN AREA CLINIC 113 | Facility: CLINIC | Age: 37
End: 2022-08-11

## 2022-10-03 ENCOUNTER — OFFICE VISIT (OUTPATIENT)
Dept: URBAN - METROPOLITAN AREA CLINIC 113 | Facility: CLINIC | Age: 37
End: 2022-10-03
Payer: COMMERCIAL

## 2022-10-03 VITALS
DIASTOLIC BLOOD PRESSURE: 76 MMHG | BODY MASS INDEX: 20.81 KG/M2 | WEIGHT: 157 LBS | TEMPERATURE: 98 F | RESPIRATION RATE: 20 BRPM | HEIGHT: 73 IN | SYSTOLIC BLOOD PRESSURE: 124 MMHG | HEART RATE: 83 BPM

## 2022-10-03 DIAGNOSIS — R63.4 WEIGHT LOSS: ICD-10-CM

## 2022-10-03 DIAGNOSIS — R10.13 EPIGASTRIC PAIN: ICD-10-CM

## 2022-10-03 DIAGNOSIS — E80.6 INDIRECT HYPERBILIRUBINEMIA: ICD-10-CM

## 2022-10-03 DIAGNOSIS — R14.0 ABDOMINAL BLOATING: ICD-10-CM

## 2022-10-03 PROCEDURE — 99214 OFFICE O/P EST MOD 30 MIN: CPT | Performed by: INTERNAL MEDICINE

## 2022-10-03 NOTE — HPI-TODAY'S VISIT:
This is a 37-year-old male with a history of chronic epigastric abdominal pain and indirect hyperbilirubinemia likely Gilbert's syndrome presenting for follow-up.  He was last seen on 4/14/22.  He had initially been seen in February 2021 and reported a 1 year history of constant pain.  Diagnostic evaluation with labs, CT, EGD, and MRCP were unremarkable.  There had been no improvement with pantoprazole.  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  He continued to have pain indicating epigastrium when his stomach was empty.  It was not exacerbated by eating and he felt as though his food was not digesting.  He had frequent belching and bloating.  He had intermittent nausea associated with symptoms.  He denied other abdominal symptoms.  It was discussed that his symptoms are chronic and likely functional associated with nonulcer dyspepsia and that diet may be playing a role.  Lifestyle modification was discussed to reduce air swallowing and a diet low in fermentable sugars was recommended.  He was to try FDgard prior to meals.  Cymbalta was a future consideration.  Due to a history of a small duodenal ulcer, repeat EGD was also a consideration.  At his visit here on 1/7/22, he continued to report persistent, ongoing symptoms.  He had been on a low FODMAP diet since his prior visit and reported no improvement.  He c/o frequent belching and bloating.  He continued to slowly lose weight (our records reflected a 2 pound weight loss since his last visit, and 4 lbs overall).  He had persistent epigastric pain, worse after meals, and occasionally worse during a bowel movement.  He had worse pain on Nexium and discontinued it.  He denies NSAID use.  He reported intermittent dark urine even with drinking adequate amounts of water.  He described occasional skin rashes and itching. He continued to report a sensation that his food was not digesting and said it "sits in (his) stomach too long."  He reported intermittent diarrhea.  He was having 2-3 loose bowel movements per day most days.  His stools were occasionally watery.  He denied nocturnal bowel movements, red blood per rectum, or melena.   After his 1/7/22 visit with Abby Mcnair, the patient had an order for Xifaxan sent in, and was supposed to be ordered for a Doppler sonogram of the liver and mesenteric vessels.  Neither of these took place, as the Xifaxan was too expensive, and the Doppler sonogram was never done.  At the time of his 4/14/2022 visit, his symptoms persisted, as described above, and were unchanged.  He had complaints of periodic epigastric pain on an almost daily basis, with increased bloating and belching.  The decision was made to place him on Xifaxan, and he had a CT angiogram scheduled which was done on August 4, 2022.  This showed a narrowed celiac axis with prestenotic dilatation consistent with median arcuate ligament syndrome.  He was referred to Dr. Idris Swartz, who corroborated the diagnosis and refer the patient to Dr. Herve Oliva.  Dr. Oliva felt that he could do the surgery, but the patient's insurance would not allow him to do the surgery.  Dr. Oliva subsequently made the referral to Dr. John Carrillo, who the patient is to see tomorrow for the first time.

## 2023-01-03 ENCOUNTER — LAB OUTSIDE AN ENCOUNTER (OUTPATIENT)
Dept: URBAN - METROPOLITAN AREA CLINIC 113 | Facility: CLINIC | Age: 38
End: 2023-01-03

## 2023-01-03 ENCOUNTER — OFFICE VISIT (OUTPATIENT)
Dept: URBAN - METROPOLITAN AREA CLINIC 113 | Facility: CLINIC | Age: 38
End: 2023-01-03
Payer: COMMERCIAL

## 2023-01-03 VITALS
RESPIRATION RATE: 16 BRPM | HEART RATE: 83 BPM | SYSTOLIC BLOOD PRESSURE: 128 MMHG | TEMPERATURE: 98.2 F | BODY MASS INDEX: 21.6 KG/M2 | WEIGHT: 163 LBS | HEIGHT: 73 IN | DIASTOLIC BLOOD PRESSURE: 83 MMHG

## 2023-01-03 DIAGNOSIS — E80.6 INDIRECT HYPERBILIRUBINEMIA: ICD-10-CM

## 2023-01-03 DIAGNOSIS — R63.4 WEIGHT LOSS: ICD-10-CM

## 2023-01-03 DIAGNOSIS — R14.0 ABDOMINAL BLOATING: ICD-10-CM

## 2023-01-03 DIAGNOSIS — R10.13 EPIGASTRIC PAIN: ICD-10-CM

## 2023-01-03 PROBLEM — 7752002: Status: ACTIVE | Noted: 2020-09-19

## 2023-01-03 PROBLEM — 89362005: Status: ACTIVE | Noted: 2022-01-08

## 2023-01-03 PROBLEM — 116289008: Status: ACTIVE | Noted: 2022-01-08

## 2023-01-03 PROCEDURE — 99214 OFFICE O/P EST MOD 30 MIN: CPT | Performed by: INTERNAL MEDICINE

## 2023-01-03 RX ORDER — HYDROXYZINE PAMOATE 25 MG/1
1 CAPSULE AT BEDTIME AS NEEDED CAPSULE ORAL
Qty: 60 | Refills: 2 | OUTPATIENT
Start: 2023-01-03

## 2023-01-03 NOTE — HPI-TODAY'S VISIT:
This is a 37-year-old male with a history of chronic epigastric abdominal pain and indirect hyperbilirubinemia likely Gilbert's syndrome presenting for follow-up.  He was last seen on 4/14/22.  He had initially been seen in February 2021 and reported a 1 year history of constant pain.  Diagnostic evaluation with labs, CT, EGD, and MRCP were unremarkable.  There had been no improvement with pantoprazole.  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  He continued to have pain indicating epigastrium when his stomach was empty.  It was not exacerbated by eating and he felt as though his food was not digesting.  He had frequent belching and bloating.  He had intermittent nausea associated with symptoms.  He denied other abdominal symptoms.  It was discussed that his symptoms are chronic and likely functional associated with nonulcer dyspepsia and that diet may be playing a role.  Lifestyle modification was discussed to reduce air swallowing and a diet low in fermentable sugars was recommended.  He was to try FDgard prior to meals.  Cymbalta was a future consideration.  Due to a history of a small duodenal ulcer, repeat EGD was also a consideration.  At his visit here on 1/7/22, he continued to report persistent, ongoing symptoms.  He had been on a low FODMAP diet since his prior visit and reported no improvement.  He c/o frequent belching and bloating.  He continued to slowly lose weight (our records reflected a 2 pound weight loss since his last visit, and 4 lbs overall).  He had persistent epigastric pain, worse after meals, and occasionally worse during a bowel movement.  He had worse pain on Nexium and discontinued it.  He denies NSAID use.  He reported intermittent dark urine even with drinking adequate amounts of water.  He described occasional skin rashes and itching. He continued to report a sensation that his food was not digesting and said it "sits in (his) stomach too long."  He reported intermittent diarrhea.  He was having 2-3 loose bowel movements per day most days.  His stools were occasionally watery.  He denied nocturnal bowel movements, red blood per rectum, or melena.   After his 1/7/22 visit with Abby Mcnair, the patient had an order for Xifaxan sent in, and was supposed to be ordered for a Doppler sonogram of the liver and mesenteric vessels.  Neither of these took place, as the Xifaxan was too expensive, and the Doppler sonogram was never done.  At the time of his 4/14/2022 visit, his symptoms persisted, as described above, and were unchanged.  He had complaints of periodic epigastric pain on an almost daily basis, with increased bloating and belching.  The decision was made to place him on Xifaxan, and he had a CT angiogram scheduled which was done on August 4, 2022.  This showed a narrowed celiac axis with prestenotic dilatation consistent with median arcuate ligament syndrome.  He was referred to Dr. Idris Swartz, who corroborated the diagnosis and refer the patient to Dr. Herve Oliva.  Dr. Oliva felt that he could do the surgery, but the patient's insurance would not allow him to do the surgery.  Dr. Oliva subsequently made the referral to Dr. John Carrillo, who  performed the surgery two months ago.  Unfortunately, this has not resulted in any improvement in his symptoms.  He still has complaints of nausea, epigastric pain which he describes as "tightness", early satiety, and weight loss.  He has bloating after eating, and also is concerned because he continues to have problems with intermittent jaundice.  He typically develops jaundice after exercise, and can actually have pruritus if the jaundice becomes severe enough.

## 2023-01-04 ENCOUNTER — TELEPHONE ENCOUNTER (OUTPATIENT)
Dept: URBAN - METROPOLITAN AREA CLINIC 113 | Facility: CLINIC | Age: 38
End: 2023-01-04

## 2023-01-19 ENCOUNTER — ERX REFILL RESPONSE (OUTPATIENT)
Dept: URBAN - METROPOLITAN AREA CLINIC 113 | Facility: CLINIC | Age: 38
End: 2023-01-19

## 2023-01-19 RX ORDER — RIFAXIMIN 550 MG/1
(PA IN PROCESS) TAKE 1 TABLET BY MOUTH THREE TIMES A DAY FOR 14 DAYS TABLET ORAL
Qty: 42 TABLET | Refills: 2 | OUTPATIENT

## 2023-01-23 ENCOUNTER — TELEPHONE ENCOUNTER (OUTPATIENT)
Dept: URBAN - METROPOLITAN AREA CLINIC 113 | Facility: CLINIC | Age: 38
End: 2023-01-23

## 2023-01-27 ENCOUNTER — LAB OUTSIDE AN ENCOUNTER (OUTPATIENT)
Dept: URBAN - METROPOLITAN AREA CLINIC 113 | Facility: CLINIC | Age: 38
End: 2023-01-27

## 2023-01-27 ENCOUNTER — TELEPHONE ENCOUNTER (OUTPATIENT)
Dept: URBAN - METROPOLITAN AREA CLINIC 113 | Facility: CLINIC | Age: 38
End: 2023-01-27

## 2023-01-29 ENCOUNTER — ERX REFILL RESPONSE (OUTPATIENT)
Dept: URBAN - METROPOLITAN AREA CLINIC 113 | Facility: CLINIC | Age: 38
End: 2023-01-29

## 2023-01-29 RX ORDER — RIFAXIMIN 550 MG/1
(PA IN PROCESS) TAKE 1 TABLET BY MOUTH THREE TIMES A DAY FOR 14 DAYS TABLET ORAL
Qty: 42 TABLET | Refills: 2 | OUTPATIENT

## 2023-01-29 RX ORDER — RIFAXIMIN 550 MG/1
(PA IN PROCESS) TAKE 1 TABLET BY MOUTH THREE TIMES A DAY FOR 14 DAYS TABLET ORAL
Qty: 42 TABLET | Refills: 3 | OUTPATIENT

## 2023-02-24 ENCOUNTER — OFFICE VISIT (OUTPATIENT)
Dept: URBAN - METROPOLITAN AREA MEDICAL CENTER 43 | Facility: MEDICAL CENTER | Age: 38
End: 2023-02-24
Payer: COMMERCIAL

## 2023-02-24 DIAGNOSIS — R10.84 ABDOMINAL CRAMPING, GENERALIZED: ICD-10-CM

## 2023-02-24 DIAGNOSIS — R10.13 EPIGASTRIC PAIN: ICD-10-CM

## 2023-02-24 DIAGNOSIS — R11.2 ACUTE NAUSEA WITH NONBILIOUS VOMITING: ICD-10-CM

## 2023-02-24 DIAGNOSIS — R19.4 CHANGE IN BOWEL HABIT: ICD-10-CM

## 2023-02-24 PROCEDURE — 45378 DIAGNOSTIC COLONOSCOPY: CPT | Performed by: INTERNAL MEDICINE

## 2023-02-24 PROCEDURE — 43235 EGD DIAGNOSTIC BRUSH WASH: CPT | Performed by: INTERNAL MEDICINE

## 2023-02-28 ENCOUNTER — TELEPHONE ENCOUNTER (OUTPATIENT)
Dept: URBAN - METROPOLITAN AREA CLINIC 113 | Facility: CLINIC | Age: 38
End: 2023-02-28

## 2023-02-28 RX ORDER — RIFAXIMIN 550 MG/1
(PA IN PROCESS) TAKE 1 TABLET BY MOUTH THREE TIMES A DAY FOR 14 DAYS TABLET ORAL
Qty: 42 TABLET | Refills: 2 | Status: ACTIVE | COMMUNITY

## 2023-02-28 RX ORDER — POLYETHYLENE GLYCOL 3350, SODIUM CHLORIDE, SODIUM BICARBONATE, POTASSIUM CHLORIDE 420; 11.2; 5.72; 1.48 G/4L; G/4L; G/4L; G/4L
AS DIRECTED POWDER, FOR SOLUTION ORAL ONCE
Qty: 420 GM | Refills: 0 | OUTPATIENT
Start: 2023-02-28 | End: 2023-03-01

## 2023-02-28 RX ORDER — HYDROXYZINE PAMOATE 25 MG/1
1 CAPSULE AT BEDTIME AS NEEDED CAPSULE ORAL
Qty: 60 | Refills: 2 | Status: ACTIVE | COMMUNITY
Start: 2023-01-03

## 2023-03-02 ENCOUNTER — OFFICE VISIT (OUTPATIENT)
Dept: URBAN - METROPOLITAN AREA MEDICAL CENTER 2 | Facility: MEDICAL CENTER | Age: 38
End: 2023-03-02
Payer: COMMERCIAL

## 2023-03-02 DIAGNOSIS — R10.84 ABDOMINAL CRAMPING, GENERALIZED: ICD-10-CM

## 2023-03-02 DIAGNOSIS — K63.5 BENIGN COLON POLYP: ICD-10-CM

## 2023-03-02 DIAGNOSIS — K63.89 APPENDICITIS EPIPLOICA: ICD-10-CM

## 2023-03-02 DIAGNOSIS — R19.7 ACUTE DIARRHEA: ICD-10-CM

## 2023-03-02 PROCEDURE — 45380 COLONOSCOPY AND BIOPSY: CPT | Performed by: INTERNAL MEDICINE

## 2023-03-02 PROCEDURE — 45385 COLONOSCOPY W/LESION REMOVAL: CPT | Performed by: INTERNAL MEDICINE

## 2023-03-06 PROBLEM — 79922009: Status: ACTIVE | Noted: 2020-09-16

## 2023-03-14 ENCOUNTER — TELEPHONE ENCOUNTER (OUTPATIENT)
Dept: URBAN - METROPOLITAN AREA CLINIC 113 | Facility: CLINIC | Age: 38
End: 2023-03-14

## 2023-04-10 ENCOUNTER — DASHBOARD ENCOUNTERS (OUTPATIENT)
Age: 38
End: 2023-04-10

## 2023-04-10 ENCOUNTER — WEB ENCOUNTER (OUTPATIENT)
Dept: URBAN - METROPOLITAN AREA CLINIC 113 | Facility: CLINIC | Age: 38
End: 2023-04-10

## 2023-04-10 ENCOUNTER — OFFICE VISIT (OUTPATIENT)
Dept: URBAN - METROPOLITAN AREA CLINIC 113 | Facility: CLINIC | Age: 38
End: 2023-04-10
Payer: COMMERCIAL

## 2023-04-10 VITALS
HEART RATE: 65 BPM | BODY MASS INDEX: 21.2 KG/M2 | RESPIRATION RATE: 18 BRPM | HEIGHT: 73 IN | WEIGHT: 160 LBS | DIASTOLIC BLOOD PRESSURE: 75 MMHG | TEMPERATURE: 97.5 F | SYSTOLIC BLOOD PRESSURE: 125 MMHG

## 2023-04-10 DIAGNOSIS — E80.6 INDIRECT HYPERBILIRUBINEMIA: ICD-10-CM

## 2023-04-10 DIAGNOSIS — R10.13 EPIGASTRIC PAIN: ICD-10-CM

## 2023-04-10 DIAGNOSIS — R14.0 ABDOMINAL BLOATING: ICD-10-CM

## 2023-04-10 PROCEDURE — 99214 OFFICE O/P EST MOD 30 MIN: CPT | Performed by: INTERNAL MEDICINE

## 2023-04-10 RX ORDER — HYDROXYZINE PAMOATE 25 MG/1
1 CAPSULE AT BEDTIME AS NEEDED CAPSULE ORAL
Qty: 60 | Refills: 2 | Status: ON HOLD | COMMUNITY
Start: 2023-01-03

## 2023-04-10 RX ORDER — RIFAXIMIN 550 MG/1
(PA IN PROCESS) TAKE 1 TABLET BY MOUTH THREE TIMES A DAY FOR 14 DAYS TABLET ORAL
Qty: 42 TABLET | Refills: 2 | Status: ON HOLD | COMMUNITY

## 2023-04-10 NOTE — HPI-TODAY'S VISIT:
This is a 37-year-old male with a history of chronic epigastric abdominal pain and indirect hyperbilirubinemia likely Gilbert's syndrome presenting for follow-up.  He was last seen on 1/3/23.  He had initially been seen in February 2021 and reported a 1 year history of constant pain.  Diagnostic evaluation with labs, CT, EGD, and MRCP were unremarkable.  There had been no improvement with pantoprazole.  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  He continued to have pain indicating epigastrium when his stomach was empty.  It was not exacerbated by eating and he felt as though his food was not digesting.  He had frequent belching and bloating.  He had intermittent nausea associated with symptoms.  He denied other abdominal symptoms.  It was discussed that his symptoms are chronic and likely functional associated with nonulcer dyspepsia and that diet may be playing a role.  Lifestyle modification was discussed to reduce air swallowing and a diet low in fermentable sugars was recommended.  He was to try FDgard prior to meals.  Cymbalta was a future consideration.  Due to a history of a small duodenal ulcer, repeat EGD was also a consideration.  At his visit here on 1/7/22, he continued to report persistent, ongoing symptoms.  He had been on a low FODMAP diet since his prior visit and reported no improvement.  He c/o frequent belching and bloating.  He continued to slowly lose weight (our records reflected a 2 pound weight loss since his last visit, and 4 lbs overall).  He had persistent epigastric pain, worse after meals, and occasionally worse during a bowel movement.  He had worse pain on Nexium and discontinued it.  He denies NSAID use.  He reported intermittent dark urine even with drinking adequate amounts of water.  He described occasional skin rashes and itching. He continued to report a sensation that his food was not digesting and said it "sits in (his) stomach too long."  He reported intermittent diarrhea.  He was having 2-3 loose bowel movements per day most days.  His stools were occasionally watery.  He denied nocturnal bowel movements, red blood per rectum, or melena.   After his 1/7/22 visit with Abby Mcnair, the patient had an order for Xifaxan sent in, and was supposed to be ordered for a Doppler sonogram of the liver and mesenteric vessels.  Neither of these took place, as the Xifaxan was too expensive, and the Doppler sonogram was never done.  At the time of his 4/14/2022 visit, his symptoms persisted, as described above, and were unchanged.  He had complaints of periodic epigastric pain on an almost daily basis, with increased bloating and belching.  The decision was made to place him on Xifaxan, and he had a CT angiogram scheduled which was done on August 4, 2022.  This showed a narrowed celiac axis with prestenotic dilatation consistent with median arcuate ligament syndrome.  He was referred to Dr. Idris Swartz, who corroborated the diagnosis and referred the patient to Dr. Herve Oliva.  Dr. Oliva felt that he could do the surgery, but the patient's insurance would not allow him to do the surgery.  Dr. Oliva subsequently made the referral to Dr. John Carrillo, who  performed the surgery in November 2022.  Unfortunately, this did not result in any improvement in his symptoms.  He still had complaints of nausea, epigastric pain (which he described as "tightness"), early satiety, and weight loss.  He had bloating after eating, and also was concerned because he continued to have problems with intermittent jaundice.  He typically develops jaundice after exercise, and can actually have pruritus if the jaundice becomes severe enough.After his last visit, we checked a complete metabolic panel and 1/3/2023.  This was normal except for total bilirubin of 3.9.  His AST was 25, ALT 21, alkaline phosphatase 84, and albumin 4.5.  Other labs are normal.  He had an EGD done for February 24, 2023 which was normal.  Colonoscopy done on March 2, 2023 showed a 5 mm rectosigmoid polyp which was hyperplastic and a 1 mm transverse colon polyp which was normal tissue.  He had first-degree hemorrhoids noted.  This is the only abnormality.  The patient continues to have complaints of postprandial epigastric discomfort and bloating.  His bowels are moving regularly.  He is frustrated that he does not have a definitive answer.  He asked questions about small bowel bacterial overgrowth, chronic pancreatitis, and other etiologies, all of which have been addressed and/or empirically treated.  He has never been formally tested for small bowel bacterial overgrowth, and has never had formal motility testing of the small bowel or colon.

## 2023-04-17 ENCOUNTER — TELEPHONE ENCOUNTER (OUTPATIENT)
Dept: URBAN - METROPOLITAN AREA CLINIC 113 | Facility: CLINIC | Age: 38
End: 2023-04-17

## 2023-05-10 ENCOUNTER — TELEPHONE ENCOUNTER (OUTPATIENT)
Dept: URBAN - METROPOLITAN AREA CLINIC 113 | Facility: CLINIC | Age: 38
End: 2023-05-10

## 2023-05-27 ENCOUNTER — TELEPHONE ENCOUNTER (OUTPATIENT)
Dept: URBAN - METROPOLITAN AREA CLINIC 113 | Facility: CLINIC | Age: 38
End: 2023-05-27